# Patient Record
Sex: MALE | Race: OTHER | HISPANIC OR LATINO | URBAN - METROPOLITAN AREA
[De-identification: names, ages, dates, MRNs, and addresses within clinical notes are randomized per-mention and may not be internally consistent; named-entity substitution may affect disease eponyms.]

---

## 2021-11-26 ENCOUNTER — EMERGENCY (EMERGENCY)
Facility: HOSPITAL | Age: 36
LOS: 1 days | Discharge: HOME | End: 2021-11-26
Attending: EMERGENCY MEDICINE
Payer: MEDICAID

## 2021-11-26 VITALS
WEIGHT: 149.91 LBS | RESPIRATION RATE: 16 BRPM | HEART RATE: 70 BPM | TEMPERATURE: 98 F | DIASTOLIC BLOOD PRESSURE: 89 MMHG | SYSTOLIC BLOOD PRESSURE: 137 MMHG | OXYGEN SATURATION: 96 %

## 2021-11-26 PROCEDURE — 99284 EMERGENCY DEPT VISIT MOD MDM: CPT

## 2021-11-27 VITALS
HEART RATE: 67 BPM | RESPIRATION RATE: 18 BRPM | TEMPERATURE: 98 F | DIASTOLIC BLOOD PRESSURE: 82 MMHG | OXYGEN SATURATION: 99 % | SYSTOLIC BLOOD PRESSURE: 132 MMHG

## 2021-11-27 PROCEDURE — 99283 EMERGENCY DEPT VISIT LOW MDM: CPT

## 2021-11-27 RX ORDER — DIPHENHYDRAMINE HCL 50 MG
25 CAPSULE ORAL ONCE
Refills: 0 | Status: COMPLETED | OUTPATIENT
Start: 2021-11-27 | End: 2021-11-27

## 2021-11-27 RX ORDER — FAMOTIDINE 10 MG/ML
20 INJECTION INTRAVENOUS ONCE
Refills: 0 | Status: COMPLETED | OUTPATIENT
Start: 2021-11-27 | End: 2021-11-27

## 2021-11-27 RX ORDER — DEXAMETHASONE 0.5 MG/5ML
12 ELIXIR ORAL ONCE
Refills: 0 | Status: COMPLETED | OUTPATIENT
Start: 2021-11-27 | End: 2021-11-27

## 2021-11-27 RX ADMIN — Medication 12 MILLIGRAM(S): at 01:04

## 2021-11-27 RX ADMIN — Medication 25 MILLIGRAM(S): at 01:04

## 2021-11-27 RX ADMIN — FAMOTIDINE 20 MILLIGRAM(S): 10 INJECTION INTRAVENOUS at 01:05

## 2021-11-27 NOTE — ED PROVIDER NOTE - NSFOLLOWUPCLINICS_GEN_ALL_ED_FT
[facility] Allergy and Immunology  Allergy  [address] [place] [address]  Phone: [phone]  Fax:

## 2021-11-27 NOTE — ED PROVIDER NOTE - OBJECTIVE STATEMENT
36 year old male denies PMH coming in with generalized pruritis and hives. pt states has had this before in the past. unsure what he is allergic to. ate at home today. denies SOB. throat swelling, cough, fevers, chills, sweats, abd pains, N/v/d/c, urinary complaints.

## 2021-11-27 NOTE — ED PROVIDER NOTE - PATIENT PORTAL LINK FT
You can access the FollowMyHealth Patient Portal offered by [facility] by registering at the following website: [email or web address]. By joining [email or web address]’s FollowMyHealth portal, you will also be able to view your health information using other applications (apps) compatible with our system.

## 2021-11-27 NOTE — ED PROVIDER NOTE - NSFOLLOWUPINSTRUCTIONS_ED_ALL_ED_FT
Log Out.      [employer]® CareNotes®     :  [facility]  	                       ALLERGIES - AfterCare(R) Instructions(ER/ED)           Alergias    LO QUE NECESITA SABER:    Las alergias son latosha reacción del sistema inmunológico a latosha sustancia llamada alérgeno. El sistema inmunológico ve el alérgeno gerardo latosha amenaza y lo ataca. Latosha reacción alérgica puede ser leve o de peligro mortal. Latosha reacción de peligro mortal se conoce gerardo anafilaxia. La anafilaxia es latosha reacción repentina y de peligro mortal que requiere de tratamiento inmediato.    INSTRUCCIONES SOBRE EL DEON HOSPITALARIA:    Llame al 911 en kemar de presentar signos o síntomas de anafilaxia,gerardo dificultad para respirar, inflamación en hyman boca o garganta, o sibilancias. También es posible que tenga comezón, sarpullido, urticaria o sienta que se va a desmayar.    Regrese a la denisha de emergencias si:  •Tiene la sensación de hormigueo en las patricia o pies.      •Hyman piel está enrojecida o ruborizada.      Comuníquese con hyman médico si:  •Usted tiene preguntas o inquietudes acerca de hyman condición o cuidado.          Medicamentos:  •Los antihistamínicossirven para reducir el comezón, estornudo e inflamación. Usted los puede kb en forma de píldora o de gotas en lacie ojos o nariz.      •Los descongestionantesayudan a que hyman nariz se sienta menos congestionada.      •Los tratamientos de uso tópicoayudan a reducir el comezón o inflamación. Usted también podría recibir aerosoles nasales o gotas para los ojos.      •La epinefrinase usa para tratar latosha reacción alérgica grave gerardo la anafilaxis.      •[place] lacie medicamentos gerardo se le haya indicado.Consulte con hyman médico si usted abhilash que hyman medicamento no le está ayudando o si presenta efectos secundarios. Infórmele si es alérgico a algún medicamento. Mantenga latosha lista actualizada de los medicamentos, las vitaminas y los productos herbales que jackie. Incluya los siguientes datos de los medicamentos: cantidad, frecuencia y motivo de administración. Traiga con usted la lista o los envases de las píldoras a lacie citas de seguimiento. Lleve la lista de los medicamentos con usted en kemar de latosha emergencia.      Pautas que necesito seguir para los signos o síntomas de la anafilaxia:  •Inmediatamenteaplíquese 1 inyección de epinefrina lennox hágalo solamente en el músculo del muslo que da hacia afuera.      •Deje la inyección en el lugarsegún las indicaciones. Es probable que hyman médico le recomiende que se la deje puesta por hasta 10 segundos antes de quitarla. [place] ayuda a asegurarse de que toda la epinefrina sea aplicada.      •Llame al 911 y vaya al servicio de urgencias,aunque la inyección haya linsey lacie síntomas. No maneje usted mismo. Lleve con usted la inyección de epinefrina que usó.      Precauciones de seguridad a kb si usted corre riesgo de anafilaxia:  •Tenga a mano 2 inyecciones de epinefrina en todo momento.Puede que usted necesite ltaosha segunda inyección ya que la epinefrina solamente actúa por aproximadamente 20 minutos y los síntomas podrían regresar. Hyman médico puede mostrarle a usted y a lacie familiares cómo aplicar la inyección. Revise la fecha de vencimiento todos los meses y reemplace el medicamento de hyman vencimiento.      •Elabore un plan de acción.Hyman médico puede ayudarle a crear un plan escrito que explique la alergia y un plan de emergencia para tratar latosha reacción. El plan explica cuándo aplicar latosha segunda inyección de epinefrina si los síntomas vuelven o no mejoran después de la primera inyección. Asegúrese de que lacie familiares y personal de hyman trabajo tengan copias del plan de acción y las instrucciones de emergencia. Muéstreles cómo aplicar la inyección de epinefrina.      •Tenga cuidado cuando axel ejercicio.Si usted tuvo anafilaxis inducida por el ejercicio, no se ejercite inmediatamente después de comer. Pare de ejercitarse de inmediato si empieza a desarrollar cualquier signo o síntoma de anafilaxia. Puede que al principio se sienta cansado, caliente o tenga comezón en la piel. También podría desarrollar urticaria, inflamación y problemas graves de respiración si continúa ejercitándose.      •Lleve latosha identificación de alerta médica.Use un brazalete o collar de alerta médica o lleve latosha tarjeta que explique la alergia. Pregúntele a hyman médico dónde conseguir estos artículos.   Accesorios de alerta médica           •Informe a todos los médicos sobre la alergia.Estos incluyen a los odontólogos, enfermeras, médicos y cirujanos.      Controle las alergias:  •Use enjuagues nasales según las indicaciones.Hágase enjuagues con latosha solución salina diariamente. [place] ayudará a eliminar los alérgenos de hyman nariz. Use agua destilada, si es posible. También puede hervir agua del grifo y dejar que se enfríe antes de usarla. No utilice agua del grifo que no haya sido hervida.      •No fume.Los síntomas de la alergia disminuyen si no está alrededor del humo. La nicotina y otras sustancias químicas que contienen los cigarrillos y cigarros pueden dañar los pulmones. Pida información a hyman médico si usted actualmente fuma y necesita ayuda para dejar de fumar. Los cigarrillos electrónicos o el tabaco sin humo igualmente contienen nicotina. Consulte con hyman médico antes de utilizar estos productos.      Prevenga latosha reacción alérgica:  •No salga afuera cuando el recuento de polen esté muy alto en kemar de sufrir de alergias estacionales.Lacie síntomas podrían mejorar si usted sale afuera solo por la mañana o la noche. Use el aire acondicionado y cambie los filtros de aire a menudo.      •Evite el polvo, pelaje y moho.Limpie el polvo y aspire hyman hogar a menudo. Es posible que usted necesite usar latosha máscara al hacerlo. Mantenga a las mascotas en ciertas habitaciones y báñelos frecuentemente. Use un deshumidificador (máquina que reduce la humedad) para ayudar a evitar el moho.      •No use productos que contengan látex en kemar de tener alergia al látex.Si usted trabaja en la juan de la ellie o preparando alimentos, utilice guantes sin látex. Siempre informe a los médicos si usted tiene latosha alergia al látex.      •Evite áreas o actividades que atraen a los insectos en kemar que usted tenga latosha alergia a las picaduras de insectos.Las áreas incluyen los botes de basura, jardín y fanny de rikki. No use ropa de colores brillantes ni perfumes soren al estar afuera.      •Evite latosha reacción alérgica causada por los alimentos.Puede tener latosha reacción si hyman comida no se prepara de un modo seguro. Por ejemplo, podrían servirle comida que haya estado en contacto con el alimento desencadenante veronica la preparación. [place] se conoce gerardo contaminación cruzada. Las herramientas de la cocina también pueden causar contaminación cruzada. También puede comer productos horneados que contienen un alimento desencadenante que usted desconoce. Pregunte si el producto contiene el alimento desencadenante antes de manipularlo o comerlo.      Acuda a lacie consultas de control con hyman médico según le indicaron.Anote lacie preguntas para que se acuerde de hacerlas veronica lacie visitas. Cuando tenga latosha reacción alérgica, anote todo a lo que estuvo expuesto en las últimas 2 horas antes de la reacción. Lleve esta información a hyman próxima patrice.       © Copyright [employer] 2021           back to top                          © Copyright [employer] 2021

## 2021-12-01 PROBLEM — Z00.00 ENCOUNTER FOR PREVENTIVE HEALTH EXAMINATION: Status: ACTIVE | Noted: 2021-12-01

## 2021-12-02 ENCOUNTER — APPOINTMENT (OUTPATIENT)
Dept: PEDIATRIC ALLERGY IMMUNOLOGY | Facility: CLINIC | Age: 36
End: 2021-12-02
Payer: MEDICAID

## 2021-12-02 VITALS
DIASTOLIC BLOOD PRESSURE: 69 MMHG | SYSTOLIC BLOOD PRESSURE: 122 MMHG | HEART RATE: 56 BPM | WEIGHT: 154 LBS | HEIGHT: 68 IN | BODY MASS INDEX: 23.34 KG/M2

## 2021-12-02 DIAGNOSIS — T78.1XXA OTHER ADVERSE FOOD REACTIONS, NOT ELSEWHERE CLASSIFIED, INITIAL ENCOUNTER: ICD-10-CM

## 2021-12-02 DIAGNOSIS — Z88.9 ALLERGY STATUS TO UNSPECIFIED DRUGS, MEDICAMENTS AND BIOLOGICAL SUBSTANCES: ICD-10-CM

## 2021-12-02 DIAGNOSIS — Z82.5 FAMILY HISTORY OF ASTHMA AND OTHER CHRONIC LOWER RESPIRATORY DISEASES: ICD-10-CM

## 2021-12-02 DIAGNOSIS — Z78.9 OTHER SPECIFIED HEALTH STATUS: ICD-10-CM

## 2021-12-02 DIAGNOSIS — L50.9 URTICARIA, UNSPECIFIED: ICD-10-CM

## 2021-12-02 DIAGNOSIS — L50.8 OTHER URTICARIA: ICD-10-CM

## 2021-12-02 PROCEDURE — 99204 OFFICE O/P NEW MOD 45 MIN: CPT | Mod: 25

## 2021-12-02 PROCEDURE — 95004 PERQ TESTS W/ALRGNC XTRCS: CPT

## 2021-12-02 RX ORDER — CETIRIZINE HYDROCHLORIDE 10 MG/1
10 TABLET, COATED ORAL
Qty: 60 | Refills: 3 | Status: ACTIVE | COMMUNITY
Start: 2021-12-02 | End: 1900-01-01

## 2021-12-02 RX ORDER — EPINEPHRINE 0.3 MG/.3ML
0.3 INJECTION INTRAMUSCULAR
Qty: 2 | Refills: 0 | Status: ACTIVE | COMMUNITY
Start: 2021-12-02 | End: 1900-01-01

## 2021-12-16 PROBLEM — T78.1XXA ADVERSE FOOD REACTION, INITIAL ENCOUNTER: Status: ACTIVE | Noted: 2021-12-16

## 2021-12-16 PROBLEM — Z88.9 DRUG ALLERGY: Status: ACTIVE | Noted: 2021-12-16

## 2021-12-16 PROBLEM — L50.8 CHRONIC URTICARIA: Status: ACTIVE | Noted: 2021-12-16

## 2021-12-16 PROBLEM — Z78.9 NO SECONDHAND SMOKE EXPOSURE: Status: ACTIVE | Noted: 2021-12-16

## 2021-12-16 NOTE — SOCIAL HISTORY
[Apartment] : [unfilled] lives in an apartment  [Radiator/Baseboard] : heating provided by radiator(s)/baseboard(s) [Window Units] : air conditioning provided by window units [Cockroaches] : Patient states that there are cockroaches in the home [None] : none [Humidifier] : does not use a humidifier [Dehumidifier] : does not use a dehumidifier [Bedroom] : not in the bedroom [Living Area] : not in the living area [Smokers in Household] : there are no smokers in the home

## 2021-12-16 NOTE — PHYSICAL EXAM
[Alert] : alert [Well Nourished] : well nourished [Healthy Appearance] : healthy appearance [No Acute Distress] : no acute distress [Well Developed] : well developed [Normal Pupil & Iris Size/Symmetry] : normal pupil and iris size and symmetry [No Discharge] : no discharge [No Photophobia] : no photophobia [Sclera Not Icteric] : sclera not icteric [Normal TMs] : both tympanic membranes were normal [Normal Nasal Mucosa] : the nasal mucosa was normal [Normal Lips/Tongue] : the lips and tongue were normal [Normal Outer Ear/Nose] : the ears and nose were normal in appearance [Normal Tonsils] : normal tonsils [No Thrush] : no thrush [Supple] : the neck was supple [Normal Rate and Effort] : normal respiratory rhythm and effort [No Crackles] : no crackles [No Retractions] : no retractions [Bilateral Audible Breath Sounds] : bilateral audible breath sounds [Normal Rate] : heart rate was normal  [Normal S1, S2] : normal S1 and S2 [Regular Rhythm] : with a regular rhythm [Soft] : abdomen soft [Not Tender] : non-tender [Not Distended] : not distended [No HSM] : no hepato-splenomegaly [Normal Cervical Lymph Nodes] : cervical [Skin Intact] : skin intact  [No Rash] : no rash [No Skin Lesions] : no skin lesions [No clubbing] : no clubbing [No Edema] : no edema [No Cyanosis] : no cyanosis [Alert, Awake, Oriented as Age-Appropriate] : alert, awake, oriented as age appropriate [Pale mucosa] : no pale mucosa

## 2021-12-16 NOTE — HISTORY OF PRESENT ILLNESS
[Asthma] : asthma [de-identified] : Tiburcio is a 37 yo M who is here for initial evaluation of hives and drug allergy. .\par Patient states that he has been having hives on and off for many years, these appear on ac fossae, neck, chest, hives are raised, red and very itchy. Not associated with any food ingestion or environmental triggers. \par Drug allergy: patient has an allergy to aspirin: about 1/2 hour after ingestion he develops swollen eyes, difficulty breathing and generalized hives. Last time he took aspirin was 5 years ago, he now avoids all NSAIDs, takes Tylenol if needed. \par Patient also reports anaphylaxis to pancake, made with a pre-mixed batter, aunt Jamila and eaten with maple syrup, patient is avoiding it now. \par Patient [place], moved to [place] 3 years ago and states that back home he had sneezing attacks, he was diagnosed with dust allergy. But after moving he has no symptoms.

## 2021-12-16 NOTE — IMPRESSION
[Allergy Testing Dust Mite] : dust mites [Allergy Testing Mixed Feathers] : feathers [Allergy Testing Cockroach] : cockroach [Allergy Testing Dog] : dog [Allergy Testing Cat] : cat [Allergy Testing Trees] : trees [Allergy Testing Weeds] : weeds [Allergy Testing Grasses] : grasses [] : garlic

## 2021-12-16 NOTE — CONSULT LETTER
[Dear  ___] : Dear  [unfilled], [Consult Letter:] : I had the pleasure of evaluating your patient, [unfilled]. [Please see my note below.] : Please see my note below. [Consult Closing:] : Thank you very much for allowing me to participate in the care of this patient.  If you have any questions, please do not hesitate to contact me. [Sincerely,] : Sincerely, [FreeTextEntry2] : GUERA CARROLL [FreeTextEntry3] : Sally Campuzano MD\par Attending Physician, Division of Allergy/Immunology\par [facility]

## 2021-12-16 NOTE — REVIEW OF SYSTEMS
[Immunizations are up to date] : Immunizations are up to date [Received Influenza Vaccine this Past Year] : patient has received the Influenza vaccine this past year [Urticaria] : urticaria [Nl] : Genitourinary

## 2022-01-13 ENCOUNTER — APPOINTMENT (OUTPATIENT)
Dept: PEDIATRIC ALLERGY IMMUNOLOGY | Facility: CLINIC | Age: 37
End: 2022-01-13

## 2023-02-17 NOTE — ED PROVIDER NOTE - GASTROINTESTINAL NEGATIVE STATEMENT, MLM
Problem: Chronic Conditions and Co-morbidities  Goal: Patient's chronic conditions and co-morbidity symptoms are monitored and maintained or improved  Outcome: Progressing  Flowsheets (Taken 2/16/2023 0906 by Olivia Gannon RN)  Care Plan - Patient's Chronic Conditions and Co-Morbidity Symptoms are Monitored and Maintained or Improved: Monitor and assess patient's chronic conditions and comorbid symptoms for stability, deterioration, or improvement     Problem: Discharge Planning  Goal: Discharge to home or other facility with appropriate resources  Outcome: Progressing  Flowsheets (Taken 2/16/2023 0906 by Olivia Gannon RN)  Discharge to home or other facility with appropriate resources: Identify barriers to discharge with patient and caregiver     Problem: Skin/Tissue Integrity  Goal: Absence of new skin breakdown  Description: 1. Monitor for areas of redness and/or skin breakdown  2. Assess vascular access sites hourly  3. Every 4-6 hours minimum:  Change oxygen saturation probe site  4. Every 4-6 hours:  If on nasal continuous positive airway pressure, respiratory therapy assess nares and determine need for appliance change or resting period.   Outcome: Progressing     Problem: Safety - Adult  Goal: Free from fall injury  Outcome: Progressing no abdominal pain, no bloating, no constipation, no diarrhea, no nausea and no vomiting.